# Patient Record
Sex: MALE | Race: AMERICAN INDIAN OR ALASKA NATIVE | NOT HISPANIC OR LATINO | URBAN - METROPOLITAN AREA
[De-identification: names, ages, dates, MRNs, and addresses within clinical notes are randomized per-mention and may not be internally consistent; named-entity substitution may affect disease eponyms.]

---

## 2017-02-21 ENCOUNTER — COMMUNICATION - HEALTHEAST (OUTPATIENT)
Dept: PHYSICAL MEDICINE AND REHAB | Facility: CLINIC | Age: 57
End: 2017-02-21

## 2017-02-21 DIAGNOSIS — M54.50 RIGHT-SIDED LOW BACK PAIN WITHOUT SCIATICA: ICD-10-CM

## 2017-02-21 DIAGNOSIS — M48.061 LUMBAR STENOSIS: ICD-10-CM

## 2017-10-28 ENCOUNTER — RECORDS - HEALTHEAST (OUTPATIENT)
Dept: ADMINISTRATIVE | Facility: OTHER | Age: 57
End: 2017-10-28

## 2018-02-01 ASSESSMENT — MIFFLIN-ST. JEOR
SCORE: 2074.98
SCORE: 2074.98

## 2018-02-02 ENCOUNTER — SURGERY - HEALTHEAST (OUTPATIENT)
Dept: SURGERY | Facility: HOSPITAL | Age: 58
End: 2018-02-02

## 2018-02-02 ENCOUNTER — ANESTHESIA - HEALTHEAST (OUTPATIENT)
Dept: SURGERY | Facility: HOSPITAL | Age: 58
End: 2018-02-02

## 2018-02-06 ENCOUNTER — OFFICE VISIT - HEALTHEAST (OUTPATIENT)
Dept: GERIATRICS | Facility: CLINIC | Age: 58
End: 2018-02-06

## 2018-02-06 DIAGNOSIS — S72.143A INTERTROCHANTERIC FRACTURE (H): ICD-10-CM

## 2018-02-06 DIAGNOSIS — R53.81 PHYSICAL DECONDITIONING: ICD-10-CM

## 2018-02-09 ENCOUNTER — OFFICE VISIT - HEALTHEAST (OUTPATIENT)
Dept: GERIATRICS | Facility: CLINIC | Age: 58
End: 2018-02-09

## 2018-02-09 DIAGNOSIS — K21.9 GASTROESOPHAGEAL REFLUX DISEASE, ESOPHAGITIS PRESENCE NOT SPECIFIED: ICD-10-CM

## 2018-02-09 DIAGNOSIS — E55.9 VITAMIN D DEFICIENCY: ICD-10-CM

## 2018-02-09 DIAGNOSIS — S72.145D CLOSED NONDISPLACED INTERTROCHANTERIC FRACTURE OF LEFT FEMUR WITH ROUTINE HEALING, SUBSEQUENT ENCOUNTER: ICD-10-CM

## 2018-02-14 ENCOUNTER — OFFICE VISIT - HEALTHEAST (OUTPATIENT)
Dept: GERIATRICS | Facility: CLINIC | Age: 58
End: 2018-02-14

## 2018-02-14 DIAGNOSIS — R53.81 PHYSICAL DECONDITIONING: ICD-10-CM

## 2018-02-14 DIAGNOSIS — S72.145D CLOSED NONDISPLACED INTERTROCHANTERIC FRACTURE OF LEFT FEMUR WITH ROUTINE HEALING, SUBSEQUENT ENCOUNTER: ICD-10-CM

## 2018-02-15 ENCOUNTER — HOME CARE/HOSPICE - HEALTHEAST (OUTPATIENT)
Dept: HOME HEALTH SERVICES | Facility: HOME HEALTH | Age: 58
End: 2018-02-15

## 2018-02-16 ENCOUNTER — OFFICE VISIT - HEALTHEAST (OUTPATIENT)
Dept: GERIATRICS | Facility: CLINIC | Age: 58
End: 2018-02-16

## 2018-02-16 DIAGNOSIS — S72.145D CLOSED NONDISPLACED INTERTROCHANTERIC FRACTURE OF LEFT FEMUR WITH ROUTINE HEALING, SUBSEQUENT ENCOUNTER: ICD-10-CM

## 2018-02-16 DIAGNOSIS — K21.9 GASTROESOPHAGEAL REFLUX DISEASE, ESOPHAGITIS PRESENCE NOT SPECIFIED: ICD-10-CM

## 2018-02-19 ENCOUNTER — AMBULATORY - HEALTHEAST (OUTPATIENT)
Dept: GERIATRICS | Facility: CLINIC | Age: 58
End: 2018-02-19

## 2018-02-21 ENCOUNTER — HOME CARE/HOSPICE - HEALTHEAST (OUTPATIENT)
Dept: HOME HEALTH SERVICES | Facility: HOME HEALTH | Age: 58
End: 2018-02-21

## 2018-02-23 ENCOUNTER — HOME CARE/HOSPICE - HEALTHEAST (OUTPATIENT)
Dept: HOME HEALTH SERVICES | Facility: HOME HEALTH | Age: 58
End: 2018-02-23

## 2018-02-27 ENCOUNTER — HOME CARE/HOSPICE - HEALTHEAST (OUTPATIENT)
Dept: HOME HEALTH SERVICES | Facility: HOME HEALTH | Age: 58
End: 2018-02-27

## 2018-03-01 ENCOUNTER — HOME CARE/HOSPICE - HEALTHEAST (OUTPATIENT)
Dept: HOME HEALTH SERVICES | Facility: HOME HEALTH | Age: 58
End: 2018-03-01

## 2018-03-05 ENCOUNTER — HOME CARE/HOSPICE - HEALTHEAST (OUTPATIENT)
Dept: HOME HEALTH SERVICES | Facility: HOME HEALTH | Age: 58
End: 2018-03-05

## 2018-03-07 ENCOUNTER — HOME CARE/HOSPICE - HEALTHEAST (OUTPATIENT)
Dept: HOME HEALTH SERVICES | Facility: HOME HEALTH | Age: 58
End: 2018-03-07

## 2018-03-12 ENCOUNTER — HOME CARE/HOSPICE - HEALTHEAST (OUTPATIENT)
Dept: HOME HEALTH SERVICES | Facility: HOME HEALTH | Age: 58
End: 2018-03-12

## 2021-06-01 VITALS — BODY MASS INDEX: 31.8 KG/M2 | WEIGHT: 254.4 LBS

## 2021-06-01 VITALS — BODY MASS INDEX: 31.3 KG/M2 | WEIGHT: 250.4 LBS

## 2021-06-01 VITALS — WEIGHT: 260 LBS | HEIGHT: 75 IN | BODY MASS INDEX: 32.33 KG/M2

## 2021-06-05 ENCOUNTER — RECORDS - HEALTHEAST (OUTPATIENT)
Dept: SCHEDULING | Facility: CLINIC | Age: 61
End: 2021-06-05

## 2021-06-05 DIAGNOSIS — R13.10 DYSPHAGIA: ICD-10-CM

## 2021-06-15 NOTE — ANESTHESIA POSTPROCEDURE EVALUATION
Patient: Neftaly Sam  INTERNAL FIXATION, FRACTURE, TROCHANTERIC, HIP  Anesthesia type: general    Patient location: PACU  Last vitals:   Post vital signs: stable  Level of consciousness: awake and responds to simple questions  Post-anesthesia pain: pain controlled  Post-anesthesia nausea and vomiting: no  Pulmonary: unassisted, return to baseline  Cardiovascular: stable and blood pressure at baseline  Hydration: adequate  Anesthetic events: no    QCDR Measures:  ASA# 11 - Nelly-op Cardiac Arrest: ASA11B - Patient did NOT experience unanticipated cardiac arrest  ASA# 12 - Nelly-op Mortality Rate: ASA12B - Patient did NOT die  ASA# 13 - PACU Re-Intubation Rate: ASA13B - Patient did NOT require a new airway mgmt  ASA# 10 - Composite Anes Safety: ASA10A - No serious adverse event    Additional Notes:

## 2021-06-15 NOTE — ANESTHESIA CARE TRANSFER NOTE
Last vitals:   Vitals:    02/02/18 1519   BP: 177/87   Pulse: 81   Resp: 12   Temp: 36.9  C (98.4  F)   SpO2: 100%     Patient's level of consciousness is drowsy  Spontaneous respirations: yes  Maintains airway independently: yes  Dentition unchanged: yes  Oropharynx: oropharynx clear of all foreign objects    QCDR Measures:  ASA# 20 - Surgical Safety Checklist: WHO surgical safety checklist completed prior to induction  PQRS# 430 - Adult PONV Prevention: 4558F - Pt received => 2 anti-emetic agents (different classes) preop & intraop  ASA# 8 - Peds PONV Prevention: NA - Not pediatric patient, not GA or 2 or more risk factors NOT present  PQRS# 424 - Nelly-op Temp Management: 4559F - At least one body temp DOCUMENTED => 35.5C or 95.9F within required timeframe  PQRS# 426 - PACU Transfer Protocol: - Transfer of care checklist used  ASA# 14 - Acute Post-op Pain: ASA14B - Patient did NOT experience pain >= 7 out of 10

## 2021-06-15 NOTE — ANESTHESIA PREPROCEDURE EVALUATION
Anesthesia Evaluation      Patient summary reviewed   No history of anesthetic complications     Airway   Mallampati: II  Neck ROM: full   Pulmonary - normal exam   (+) sleep apnea, a smoker    ROS comment: Nasal congestion    Right rib fx                         Cardiovascular - negative ROS and normal exam  Exercise tolerance: > or = 4 METS  ECG reviewed        Neuro/Psych - negative ROS     Endo/Other    (+) obesity (BMI 32.5),   (-) no diabetes     GI/Hepatic/Renal - negative ROS   (+) GERD well controlled,   chronic renal disease (Hx kidney tumor. s/p partial right nephrectomy.),      Other findings:  Diagnosis    Obesity    Nocturnal leg cramps    Nasal Passage Blockage (Stuffiness)    Acute Sinusitis    Obstructive Sleep Apnea    Acute Serous Otitis Media    Allergic Rhinitis          Dental                         Anesthesia Plan  Planned anesthetic: general endotracheal  Preop EKG  Patient prefers to have GA  Glidescope intubation  Sux for intubation with zemuron pretreatment  Sugammadex reversal  ASA 3   Induction: intravenous   Anesthetic plan and risks discussed with: patient  Anesthesia plan special considerations: antiemetics,   Post-op plan: routine recovery

## 2021-06-15 NOTE — PROGRESS NOTES
"[facility]    Facility:   [facility] [[id number]]   Code Status: FULL CODE      CHIEF COMPLAINT/REASON FOR VISIT:  Chief Complaint   Patient presents with     Review Of Multiple Medical Conditions     Left hip frature s/p fall, physical deconditioning       HISTORY:      HPI: Neftaly is a 57 y.o. male who recently fell on ice and fractured his left hip. He was taken to the hospital after the fall due to inability to bear weight on injured leg. He fell on 2/1/2018 and was discharged from the hospital on 2/5/2018. He has minimal past medical history which includes a tumor on his left kidney and sleep apnea. The hospital course is summarized as follows:     \"Neftaly Sam is a 57 y.o. old male with multiple prior orthopedic surgeries admitted on 2/1/18 with a left intertrochanteric fracture after a mechanical fall now status post open reduction internal fixation by orthopedics was consulted.  Postoperative course was uncomplicated and he returned early ambulation but still required TCU at time of discharge further debilitation.     DVT prophylaxis patient should remain on 325 mg twice daily aspirin orthopedics.  Pain control was obtained with oral medications per ortho recommendations.  Patient should follow-up with Dr. Guzman (orthopedic surgeon) within 2 weeks.     Given multiple fractures workup for secondary osteoporosis was performed.  PTH, calcium, urine calcium, TSH, testosterone, estradiol, found to be normal.  Patient's vitamin D level was low.  Prior to admission patient was taking vitamin D supplementation.  Continue an additional calcium and vitamin D supplementation as an outpatient.  Would likely benefit from DEXA scan for baseline bone density as well as initiation on bisphosphonate therapy as an outpatient.     Given the fracture risk patient was switched from omeprazole to Pepcid for GERD treatment.\"    Today he is being evaluated for an intake examination to the TCU. " He has been doing well since arriving here. He shares he has been participating in PT/OT. Pain has been well controlled by the current pain medication regiment. He is looking forward to getting better and going home. He does not have any medical issues he would like to discuss today. He has been slightly constipated, he did have a BM yesterday. Nursing staff is aware. He denies any other concerns including headaches, vision changes, chest pain/pressure, difficulty breathing, SOB, abdominal pain, nausea, vomiting, diarrhea, dysuria, increasing weakness.     Past Medical History:   Diagnosis Date     Cancer     tumor left kidney     Sleep apnea              Family History   Problem Relation Age of Onset     Cancer Father      Diabetes Mother      Heart disease Mother      Social History     Social History     Marital status:      Spouse name: N/A     Number of children: N/A     Years of education: N/A     Social History Main Topics     Smoking status: Current Every Day Smoker     Packs/day: 0.50     Years: 45.00     Types: Cigarettes     Smokeless tobacco: Former User     Quit date: 10/1/2016      Comment: Quit 4/2011 and uses E-cigarette; quit 35yrs ago     Alcohol use 0.6 oz/week     1 Shots of liquor per week      Comment: very rarely, once a year.     Drug use: No     Sexual activity: Not Asked     Other Topics Concern     None     Social History Narrative       REVIEW OF SYSTEM:  Pertinent items are noted in HPI.    PHYSICAL EXAM:   /72  Pulse 63  Temp 97.2  F (36.2  C)  Resp 18  Wt (!) 254 lb 6.4 oz (115.4 kg)  SpO2 99%  BMI 31.8 kg/m2  General appearance: alert, appears stated age and cooperative  Head: Normocephalic, without obvious abnormality, atraumatic  Lungs: clear to auscultation bilaterally  Heart: regular rate and rhythm, S1, S2 normal, no murmur, click, rub or gallop  Abdomen: soft, non-tender; bowel sounds normal; no masses,  no organomegaly  Extremities: left hip edematous, two  incisions with clean/dry bandages, otherwise MAEW, using a walker for stability and pain  Pulses: 2+ and symmetric  Skin: Skin color, texture, turgor normal. No rashes or lesions  Neurologic: Grossly normal      LABS:   None today.     ASSESSMENT:      ICD-10-CM    1. Intertrochanteric fracture S72.143A    2. Physical deconditioning R53.81      PLAN:    Admission history and physical per MD in the next week. At this time continue current care plan for all chronic medical conditions, as they are stable. Encouraged patient to engage in PT/OT for strengthening and conditioning. Encouraged patient to work closely with nursing staff to ensure any medical complaints are quickly addressed. Follow up this week or sooner if needed. Will continue to monitor patient and work with nursing staff collaboratively to work toward positive patient outcomes.    Physical Deconditioning  -Continue PT/OT and other therapies as per care plan.  -Encouraged good nutrition and movement habits.   -Discussed care plan and expected course of stay.   -Continue to follow-up per routine schedule or sooner if needed.     Greater than 45 minutes spent with patient with at least 55% of this time spent on counseling, education, and discussion of the above care plan with nursing staff, and patient.     Electronically signed by: Lissett Guerra CNP

## 2021-06-16 PROBLEM — S72.143A INTERTROCHANTERIC FRACTURE OF FEMUR (H): Status: ACTIVE | Noted: 2018-02-01

## 2021-06-16 PROBLEM — S72.143A INTERTROCHANTERIC FRACTURE (H): Status: ACTIVE | Noted: 2018-02-01

## 2021-06-16 PROBLEM — R53.81 PHYSICAL DECONDITIONING: Status: ACTIVE | Noted: 2018-02-06

## 2021-06-16 NOTE — PROGRESS NOTES
[facility]    Facility:   [facility] [[id number]]   Code Status: FULL CODE      CHIEF COMPLAINT/REASON FOR VISIT:  Chief Complaint   Patient presents with     Review Of Multiple Medical Conditions       HISTORY:      HPI: Neftaly is a 57 y.o. male who is recovering from intertrochanteric proximal femur fracture.  He is continuing to have significant pain.  He also had pain in his knee on the same side such that he had orthopedic consultation and injection of steroid into the knee but there is still pain present.  He has been able to walk 1000 feet on a flat surface and 12 stairs but has a lot of pain following that.  He does have history of GERD but is asymptomatic currently.  He has used ibuprofen in the past with success for joint pains and it has not troubled his GERD    Currently on review of systems he is without fevers or chills, no headache, no sore throat or nasal congestion, no chest pain or shortness of breath or cough.  No dysuria.    Past Medical History:   Diagnosis Date     Cancer     tumor left kidney     Sleep apnea              Family History   Problem Relation Age of Onset     Cancer Father      Diabetes Mother      Heart disease Mother      Social History     Social History     Marital status:      Spouse name: N/A     Number of children: N/A     Years of education: N/A     Social History Main Topics     Smoking status: Current Every Day Smoker     Packs/day: 0.50     Years: 45.00     Types: Cigarettes     Smokeless tobacco: Former User     Quit date: 10/1/2016      Comment: Quit 4/2011 and uses E-cigarette; quit 35yrs ago     Alcohol use 0.6 oz/week     1 Shots of liquor per week      Comment: very rarely, once a year.     Drug use: No     Sexual activity: Not on file     Other Topics Concern     Not on file     Social History Narrative         Review of Systems    .  Vitals:    02/15/18 1039   BP: 127/80   Pulse: 75   Resp: 18   SpO2: 97%       Physical Exam    Constitutional: He is oriented to person, place, and time. No distress.   Eyes: Right eye exhibits no discharge. Left eye exhibits no discharge.   Cardiovascular: Normal rate, regular rhythm and normal heart sounds.    Pulmonary/Chest: Effort normal and breath sounds normal.   Abdominal: Soft. Bowel sounds are normal. He exhibits no distension. There is no tenderness.   Musculoskeletal: He exhibits no edema or tenderness.   Neurological: He is alert and oriented to person, place, and time.   Psychiatric: He has a normal mood and affect.   Nursing note and vitals reviewed.        LABS:   No recent laboratory testing    ASSESSMENT:      ICD-10-CM    1. Closed nondisplaced intertrochanteric fracture of left femur with routine healing, subsequent encounter S72.145D    2. Gastroesophageal reflux disease, esophagitis presence not specified K21.9        PLAN:    Ibuprofen 800 mg 1 pill  3 times daily on a scheduled basis.  I cautioned about the dangers for stomach upset.  I cautioned about lowering of pain threshold by continued use of oxycodone of which she is currently taking typically 2 pills of the 5 mg size on a regular basis and I advised that with the combination with ibuprofen, probably 1 pill would do fine and he wants to wean as quickly as possible.    Total 15 minutes of which 50 % was spent counseling and coordination of care of the above plan.    Electronically signed by: Soto Ward MD

## 2021-06-16 NOTE — PROGRESS NOTES
[facility]    Facility:   [facility] [[id number]]   Code Status: FULL CODE  PCP: No Primary Care Provider   Phone: None   Fax: [phone]      CHIEF COMPLAINT/REASON FOR VISIT:  Chief Complaint   Patient presents with     Discharge Summary     Left Hip Fracture, Physical Deconditioning       HISTORY COURSE:   Neftaly is a 57 y.o. male who recently fell on ice and fractured his left hip. He was taken to the hospital after the fall due to inability to bear weight on injured leg. He fell on 2/1/2018 and was discharged from the hospital on 2/5/2018. He has since been at the [facility] for rehabilitation. He has minimal past medical history which includes a tumor on his left kidney and sleep apnea.    Today he is being evaluated for a discharge examination fromthe TCU. He has been doing well since arriving here. He shares he has been doing well in PT/OT and has had some pain, however it has been controlled with pain medications. He does not have any medical issues he would like to discuss today. He denies any other concerns including headaches, vision changes, chest pain/pressure, difficulty breathing, SOB, abdominal pain, nausea, vomiting, diarrhea, dysuria, increasing weakness.     PHYSICAL EXAM:   /83  Pulse 70  Temp (!) 95.2  F (35.1  C)  Resp 16  Wt (!) 250 lb 6.4 oz (113.6 kg)  SpO2 97%  BMI 31.3 kg/m2  General appearance: alert, appears stated age and cooperative  Head: Normocephalic, without obvious abnormality, atraumatic  Lungs: clear to auscultation bilaterally  Heart: regular rate and rhythm, S1, S2 normal, no murmur, click, rub or gallop  Abdomen: soft, non-tender; bowel sounds normal; no masses,  no organomegaly  Extremities: extremities normal, atraumatic, no cyanosis or edema  Pulses: 2+ and symmetric  Skin: Skin color, texture, turgor normal. No rashes or lesions  Neurologic: Grossly normal    MEDICATION LIST:  Current Outpatient Prescriptions    Medication Sig     acetaminophen (TYLENOL) 500 MG tablet Take 2 tablets (1,000 mg total) by mouth 3 (three) times a day.     aspirin 325 MG tablet Take 1 tablet (325 mg total) by mouth 2 (two) times a day.     calcium-vitamin D 500 mg(1,250mg) -200 unit per tablet Take 2 tablets by mouth daily.     cholecalciferol, vitamin D3, 5,000 unit Tab Take 1 tablet by mouth daily. OTC     docusate sodium (COLACE) 100 MG capsule Take 1 capsule (100 mg total) by mouth 2 (two) times a day.     famotidine (PEPCID) 20 MG tablet Take 1 tablet (20 mg total) by mouth 2 (two) times a day.     hydrOXYzine pamoate (VISTARIL) 25 MG capsule Take 1 capsule (25 mg total) by mouth every 6 (six) hours as needed for anxiety.     oxyCODONE (ROXICODONE) 5 MG immediate release tablet Take 1-2 tablets (5-10 mg total) by mouth every 4 (four) hours as needed.     polyethylene glycol (MIRALAX) 17 gram packet Take 1 packet (17 g total) by mouth daily.     potassium 99 mg Tab Take 1 tablet by mouth daily as needed.      senna-docusate (PERICOLACE) 8.6-50 mg tablet Take 1 tablet by mouth 2 (two) times a day.       DISCHARGE DIAGNOSIS:    ICD-10-CM    1. Closed nondisplaced intertrochanteric fracture of left femur with routine healing, subsequent encounter S72.145D    2. Physical deconditioning R53.81        MEDICAL EQUIPMENT NEEDS:  Walker    DISCHARGE PLAN/FACE TO FACE:  I certify that services are/were furnished while this patient was under the care of a physician and that a physician or an allowed non-physician practitioner (NPP), had a face-to-face encounter that meets the physician face-to-face encounter requirements. The encounter was in whole, or in part, related to the primary reason for home health. The patient is confined to his/her home and needs intermittent skilled nursing, physical therapy, speech-language pathology, or the continued need for occupational therapy. A plan of care has been established by a physician and is periodically  reviewed by a physician.  Date of Face-to-Face Encounter: 02/14/18    I certify that, based on my findings, the following services are medically necessary home health services: PT for strengthening, balance, endurance and safety within the home, OT for strengthening, ADL needs, adaptive equipment and safety.      My clinical findings support the need for the above skilled services because: PT for strengthening, balance, endurance and safety within the home, OT for strengthening, ADL needs, adaptive equipment and safety.    This patient is homebound because: Patient is significantly physically debilitated at this time with the recent hip fracture and medication therapy.    The patient is, or has been, under my care and I have initiated the establishment of the plan of care. This patient will be followed by a physician who will periodically review the plan of care. Schedule follow up visit with primary care provider within 7 days to reestablish care.    Otherwise continue current care plan for all  chronic medical conditions, as they are stable. Encouraged patient to engage in healthy lifestyle behaviors such as engaging in social activities, exercising (PT/OT), eating well, and following their care plan. Follow up in the next week for routine check-up, or sooner if needed. Will continue to monitor patient and work with nursing staff collaboratively to work toward positive patient outcomes.    Greater than 35 minutes spent with patient with at least 55% of this time spent on counseling, education, and discussion of the above care plan with nursing staff, and patient.     Electronically signed by: Lissett Guerra CNP

## 2021-06-16 NOTE — PROGRESS NOTES
[facility]      Facility:    [facility] [[id number]]  Code Status: FULL CODE      Chief Complaint/Reason for Visit:  Chief Complaint   Patient presents with     H & P       HPI:   Neftaly is a 57 y.o. male who recently fell and suffered a left intertrochanteric fracture.  He subsequently underwent ORIF.  It was determined that he had vitamin D deficiency and was started on vitamin D3 in the form of 5000 international units daily.  He was also instructed to stop taking proton pump inhibitor for his GERD symptoms and rather use Pepcid.  He states that this is controlling symptoms, but the only time that it does not control the symptoms is if he has hot and spicy [place] foods.  He was also instructed to have a DEXA scan as an outpatient and he was wondering if this was a standard type of test for his age.   I explained that it was not a common test of men his age other than when a fracture such as his occurs, and then it is recommended to determine any extent of osteoporosis that may be present.  He also had a series of other tests that were done in the hospital to look for other signs for bone weakness, and I recommended that he go over these with his provider in the follow-up visit after discharge from transitional care unit..    Upon review of systems currently, he is not experiencing fevers nor chills nor nasal congestion or sore throat nor cough nor shortness of breath or chest pain.  He is not experiencing any abdominal pain or nausea or indigestion at the present time.  Constipation symptoms are controlled.  No dysuria.  No headache.    Past Medical History:  Past Medical History:   Diagnosis Date     Cancer     tumor left kidney     Sleep apnea            Surgical History:  Past Surgical History:   Procedure Laterality Date     HIP PINNING Left 2/2/2018    Procedure: INTERNAL FIXATION, FRACTURE, TROCHANTERIC, HIP;  Surgeon: Anshu Guzman MD;  Location: [facility];   Service:      KIDNEY SURGERY Left 10/26/2017    removed tumor     KNEE SURGERY Right 2009     SHOULDER SURGERY Right 1976, 2005     SURGERY SCROTAL / TESTICULAR      remove tumors, non cancerous       Family History:   Family History   Problem Relation Age of Onset     Cancer Father      Diabetes Mother      Heart disease Mother        Social History:    Social History     Social History     Marital status:      Spouse name: N/A     Number of children: N/A     Years of education: N/A     Social History Main Topics     Smoking status: Current Every Day Smoker     Packs/day: 0.50     Years: 45.00     Types: Cigarettes     Smokeless tobacco: Former User     Quit date: 10/1/2016      Comment: Quit 4/2011 and uses E-cigarette; quit 35yrs ago     Alcohol use 0.6 oz/week     1 Shots of liquor per week      Comment: very rarely, once a year.     Drug use: No     Sexual activity: Not on file     Other Topics Concern     Not on file     Social History Narrative          Review of Systems   All other systems reviewed and are negative.      Vitals:    02/09/18 2051   BP: 122/75   Pulse: 67   Resp: 16   Temp: (!) 96.1  F (35.6  C)   SpO2: 98%       Physical Exam   Constitutional: He is oriented to person, place, and time. No distress.   HENT:   Mouth/Throat: Oropharynx is clear and moist.   Eyes: Right eye exhibits no discharge. Left eye exhibits no discharge.   Neck: No thyromegaly present.   Cardiovascular: Normal rate, regular rhythm and normal heart sounds.    Pulmonary/Chest: Effort normal and breath sounds normal.   Abdominal: Soft. Bowel sounds are normal. He exhibits no distension. There is no tenderness.   Musculoskeletal: He exhibits no edema or tenderness.   Lymphadenopathy:     He has no cervical adenopathy.   Neurological: He is alert and oriented to person, place, and time.   Skin: Skin is warm and dry.   Psychiatric: He has a normal mood and affect. His behavior is normal. Judgment and thought content  normal.   Nursing note and vitals reviewed.      Medication List:  Current Outpatient Prescriptions   Medication Sig     acetaminophen (TYLENOL) 500 MG tablet Take 2 tablets (1,000 mg total) by mouth 3 (three) times a day.     aspirin 325 MG tablet Take 1 tablet (325 mg total) by mouth 2 (two) times a day.     calcium-vitamin D 500 mg(1,250mg) -200 unit per tablet Take 2 tablets by mouth daily.     cholecalciferol, vitamin D3, 5,000 unit Tab Take 1 tablet by mouth daily. OTC     docusate sodium (COLACE) 100 MG capsule Take 1 capsule (100 mg total) by mouth 2 (two) times a day.     famotidine (PEPCID) 20 MG tablet Take 1 tablet (20 mg total) by mouth 2 (two) times a day.     hydrOXYzine pamoate (VISTARIL) 25 MG capsule Take 1 capsule (25 mg total) by mouth every 6 (six) hours as needed for anxiety.     MAGNESIUM CARBONATE ORAL Take 1 tablet by mouth daily as needed.      oxyCODONE (ROXICODONE) 5 MG immediate release tablet Take 1-2 tablets (5-10 mg total) by mouth every 4 (four) hours as needed.     polyethylene glycol (MIRALAX) 17 gram packet Take 1 packet (17 g total) by mouth daily.     potassium 99 mg Tab Take 1 tablet by mouth daily as needed.      senna-docusate (PERICOLACE) 8.6-50 mg tablet Take 1 tablet by mouth 2 (two) times a day.       Labs:  No new laboratory testing     Assessment:    ICD-10-CM    1. Closed nondisplaced intertrochanteric fracture of left femur with routine healing, subsequent encounter S72.145D    2. Vitamin D deficiency E55.9    3. Gastroesophageal reflux disease, esophagitis presence not specified K21.9        Plan:  Continue current plan of rehabilitation with physical and Occupational Therapy.  Continue with vitamin D supplementation and use of Pepcid for GERD rather than proton pump inhibitor.      Electronically signed by: Soto Ward MD

## 2021-07-24 ENCOUNTER — HEALTH MAINTENANCE LETTER (OUTPATIENT)
Age: 61
End: 2021-07-24

## 2021-09-18 ENCOUNTER — HEALTH MAINTENANCE LETTER (OUTPATIENT)
Age: 61
End: 2021-09-18

## 2022-08-20 ENCOUNTER — HEALTH MAINTENANCE LETTER (OUTPATIENT)
Age: 62
End: 2022-08-20

## 2022-11-20 ENCOUNTER — HEALTH MAINTENANCE LETTER (OUTPATIENT)
Age: 62
End: 2022-11-20

## 2023-09-16 ENCOUNTER — HEALTH MAINTENANCE LETTER (OUTPATIENT)
Age: 63
End: 2023-09-16